# Patient Record
Sex: FEMALE | Race: WHITE
[De-identification: names, ages, dates, MRNs, and addresses within clinical notes are randomized per-mention and may not be internally consistent; named-entity substitution may affect disease eponyms.]

---

## 2018-12-27 ENCOUNTER — HOSPITAL ENCOUNTER (EMERGENCY)
Dept: HOSPITAL 92 - ERS | Age: 58
Discharge: HOME | End: 2018-12-27
Payer: COMMERCIAL
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
## 2018-12-27 DIAGNOSIS — I10: ICD-10-CM

## 2018-12-27 DIAGNOSIS — Z79.51: ICD-10-CM

## 2018-12-27 DIAGNOSIS — S52.501A: Primary | ICD-10-CM

## 2018-12-27 DIAGNOSIS — J45.909: ICD-10-CM

## 2018-12-27 DIAGNOSIS — F17.210: ICD-10-CM

## 2018-12-27 DIAGNOSIS — Z79.899: ICD-10-CM

## 2018-12-27 PROCEDURE — 29125 APPL SHORT ARM SPLINT STATIC: CPT

## 2018-12-27 NOTE — RAD
RIGHT WRIST 3 VIEWS:

 

Date:  12/27/18 

 

HISTORY:  

Wrist pain. 

 

COMPARISON:  

None. 

 

FINDINGS:

Evidence of distal ulnar fracture, which is healed. There is somewhat of an intramedullary lucency of
 the distal radius, which may be from prior injury. 

 

No acute displaced fracture or malalignment. 

 

IMPRESSION: 

1.  Old, healed distal ulnar fracture. 

2.  Lucency distal radial metadiaphysis. Possible dorsal cortical irregularity. MRI with and without 
contrast may be beneficial. 

 

CODE T. 

 

 

POS: [facility]